# Patient Record
Sex: FEMALE | Race: WHITE | NOT HISPANIC OR LATINO | Employment: UNEMPLOYED | URBAN - METROPOLITAN AREA
[De-identification: names, ages, dates, MRNs, and addresses within clinical notes are randomized per-mention and may not be internally consistent; named-entity substitution may affect disease eponyms.]

---
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
## 2024-07-09 ENCOUNTER — OFFICE VISIT (OUTPATIENT)
Dept: ENDOCRINOLOGY | Facility: CLINIC | Age: 19
End: 2024-07-09
Payer: COMMERCIAL

## 2024-07-09 VITALS
BODY MASS INDEX: 23.32 KG/M2 | OXYGEN SATURATION: 98 % | SYSTOLIC BLOOD PRESSURE: 102 MMHG | DIASTOLIC BLOOD PRESSURE: 68 MMHG | HEIGHT: 65 IN | WEIGHT: 140 LBS | HEART RATE: 72 BPM

## 2024-07-09 DIAGNOSIS — R42 LIGHTHEADEDNESS: ICD-10-CM

## 2024-07-09 DIAGNOSIS — R53.83 OTHER FATIGUE: ICD-10-CM

## 2024-07-09 DIAGNOSIS — E16.2 HYPOGLYCEMIA: Primary | ICD-10-CM

## 2024-07-09 DIAGNOSIS — E61.1 IRON DEFICIENCY: ICD-10-CM

## 2024-07-09 PROCEDURE — 99204 OFFICE O/P NEW MOD 45 MIN: CPT | Performed by: INTERNAL MEDICINE

## 2024-07-09 RX ORDER — MULTIVITAMIN
1 TABLET ORAL DAILY
COMMUNITY

## 2024-07-09 NOTE — PATIENT INSTRUCTIONS
Please have the lab work done around 8 AM.  Please check your blood sugars at least once daily and when you have symptoms of low blood sugars.     Hypoglycemia instructions   Low Blood Sugar  Steps to treat low blood sugar.    1. Test blood sugar if you have symptoms of low blood sugar:   Low Blood Sugar Symptoms:  o Sweaty  o Dizzy  o Rapid heartbeat  o Shaky  o Bad mood  o Hungry    2. Treat blood sugar less than 70 with 15 grams of fast-acting carbohydrate:   Examples of 15 grams Fast-Acting Carbohydrate:  o 4 oz juice  o 4 oz regular soda  o 3-4 glucose tablets (chew)  o 3-4 hard candies (chew)            3.   Wait 15 minutes and test your blood sugar again         4. If blood sugar is less than 100, repeat steps 2-3.    5. When your blood sugar is 100 or more, eat a snack if it will be longer than one hour until your next meal. The snack should be 15 grams of carbohydrate and a protein:   Examples of snacks:  o ½ sandwich  o 6 crackers with cheese  o Piece of fruit with cheese or peanut butter  o 6 crackers with peanut butter
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
## 2024-07-10 ENCOUNTER — APPOINTMENT (OUTPATIENT)
Dept: LAB | Facility: CLINIC | Age: 19
End: 2024-07-10
Payer: COMMERCIAL

## 2024-07-10 DIAGNOSIS — R42 LIGHTHEADEDNESS: ICD-10-CM

## 2024-07-10 DIAGNOSIS — E16.2 HYPOGLYCEMIA: ICD-10-CM

## 2024-07-10 DIAGNOSIS — E61.1 IRON DEFICIENCY: ICD-10-CM

## 2024-07-10 LAB
CORTIS AM PEAK SERPL-MCNC: 9.9 UG/DL (ref 6.7–22.6)
ERYTHROCYTE [DISTWIDTH] IN BLOOD BY AUTOMATED COUNT: 12.6 % (ref 11.6–15.1)
EST. AVERAGE GLUCOSE BLD GHB EST-MCNC: 103 MG/DL
FERRITIN SERPL-MCNC: 11 NG/ML (ref 11–307)
HBA1C MFR BLD: 5.2 %
HCT VFR BLD AUTO: 38.4 % (ref 34.8–46.1)
HGB BLD-MCNC: 12.5 G/DL (ref 11.5–15.4)
MCH RBC QN AUTO: 29 PG (ref 26.8–34.3)
MCHC RBC AUTO-ENTMCNC: 32.6 G/DL (ref 31.4–37.4)
MCV RBC AUTO: 89 FL (ref 82–98)
PLATELET # BLD AUTO: 249 THOUSANDS/UL (ref 149–390)
PMV BLD AUTO: 10.5 FL (ref 8.9–12.7)
RBC # BLD AUTO: 4.31 MILLION/UL (ref 3.81–5.12)
T4 FREE SERPL-MCNC: 0.78 NG/DL (ref 0.61–1.12)
TSH SERPL DL<=0.05 MIU/L-ACNC: 2.6 UIU/ML (ref 0.45–4.5)
WBC # BLD AUTO: 7.62 THOUSAND/UL (ref 4.31–10.16)

## 2024-07-10 PROCEDURE — 36415 COLL VENOUS BLD VENIPUNCTURE: CPT

## 2024-07-10 PROCEDURE — 80053 COMPREHEN METABOLIC PANEL: CPT

## 2024-07-10 PROCEDURE — 84439 ASSAY OF FREE THYROXINE: CPT

## 2024-07-10 PROCEDURE — 84443 ASSAY THYROID STIM HORMONE: CPT

## 2024-07-10 PROCEDURE — 83036 HEMOGLOBIN GLYCOSYLATED A1C: CPT

## 2024-07-10 PROCEDURE — 83540 ASSAY OF IRON: CPT

## 2024-07-10 PROCEDURE — 83550 IRON BINDING TEST: CPT

## 2024-07-10 PROCEDURE — 82533 TOTAL CORTISOL: CPT

## 2024-07-10 PROCEDURE — 85027 COMPLETE CBC AUTOMATED: CPT

## 2024-07-10 PROCEDURE — 82728 ASSAY OF FERRITIN: CPT

## 2024-07-11 LAB
ALBUMIN SERPL BCG-MCNC: 4.2 G/DL (ref 3.5–5)
ALP SERPL-CCNC: 61 U/L (ref 34–104)
ALT SERPL W P-5'-P-CCNC: 11 U/L (ref 7–52)
ANION GAP SERPL CALCULATED.3IONS-SCNC: 6 MMOL/L (ref 4–13)
AST SERPL W P-5'-P-CCNC: 13 U/L (ref 13–39)
BILIRUB SERPL-MCNC: 0.42 MG/DL (ref 0.2–1)
BUN SERPL-MCNC: 6 MG/DL (ref 5–25)
CALCIUM SERPL-MCNC: 9.1 MG/DL (ref 8.4–10.2)
CHLORIDE SERPL-SCNC: 103 MMOL/L (ref 96–108)
CO2 SERPL-SCNC: 25 MMOL/L (ref 21–32)
CREAT SERPL-MCNC: 0.57 MG/DL (ref 0.6–1.3)
GFR SERPL CREATININE-BSD FRML MDRD: 134 ML/MIN/1.73SQ M
GLUCOSE P FAST SERPL-MCNC: 94 MG/DL (ref 65–99)
IRON SATN MFR SERPL: 17 % (ref 15–50)
IRON SERPL-MCNC: 59 UG/DL (ref 50–212)
POTASSIUM SERPL-SCNC: 4.5 MMOL/L (ref 3.5–5.3)
PROT SERPL-MCNC: 6.9 G/DL (ref 6.4–8.4)
SODIUM SERPL-SCNC: 134 MMOL/L (ref 135–147)
TIBC SERPL-MCNC: 344 UG/DL (ref 250–450)
UIBC SERPL-MCNC: 285 UG/DL (ref 155–355)

## 2024-09-20 ENCOUNTER — TELEPHONE (OUTPATIENT)
Dept: OTOLARYNGOLOGY | Facility: CLINIC | Age: 19
End: 2024-09-20

## 2024-10-14 ENCOUNTER — TELEPHONE (OUTPATIENT)
Dept: OTOLARYNGOLOGY | Facility: CLINIC | Age: 19
End: 2024-10-14

## 2024-10-25 ENCOUNTER — OFFICE VISIT (OUTPATIENT)
Dept: URGENT CARE | Facility: CLINIC | Age: 19
End: 2024-10-25
Payer: COMMERCIAL

## 2024-10-25 VITALS
OXYGEN SATURATION: 98 % | HEIGHT: 65 IN | RESPIRATION RATE: 18 BRPM | SYSTOLIC BLOOD PRESSURE: 114 MMHG | TEMPERATURE: 96.7 F | DIASTOLIC BLOOD PRESSURE: 60 MMHG | HEART RATE: 110 BPM | WEIGHT: 135.4 LBS | BODY MASS INDEX: 22.56 KG/M2

## 2024-10-25 DIAGNOSIS — R68.89 FLU-LIKE SYMPTOMS: Primary | ICD-10-CM

## 2024-10-25 PROCEDURE — 87636 SARSCOV2 & INF A&B AMP PRB: CPT | Performed by: FAMILY MEDICINE

## 2024-10-25 PROCEDURE — 99213 OFFICE O/P EST LOW 20 MIN: CPT | Performed by: FAMILY MEDICINE

## 2024-10-25 RX ORDER — ALBUTEROL SULFATE 1.25 MG/3ML
1.25 SOLUTION RESPIRATORY (INHALATION) EVERY 4 HOURS PRN
COMMUNITY

## 2024-10-25 RX ORDER — BENZONATATE 200 MG/1
200 CAPSULE ORAL 3 TIMES DAILY PRN
Qty: 20 CAPSULE | Refills: 0 | Status: SHIPPED | OUTPATIENT
Start: 2024-10-25

## 2024-10-25 RX ORDER — ALBUTEROL SULFATE 90 UG/1
2 INHALANT RESPIRATORY (INHALATION) EVERY 4 HOURS PRN
COMMUNITY

## 2024-10-25 RX ORDER — FLUTICASONE PROPIONATE 50 MCG
1 SPRAY, SUSPENSION (ML) NASAL 2 TIMES DAILY
Qty: 16 G | Refills: 0 | Status: SHIPPED | OUTPATIENT
Start: 2024-10-25 | End: 2024-10-28

## 2024-10-25 NOTE — PROGRESS NOTES
[facility]        NAME: Melia Jackson is a 19 y.o. female  : 2005    MRN: [id number]  DATE: 2024  TIME: 3:50 PM    Assessment and Plan   Flu-like symptoms [R68.89]  1. Flu-like symptoms  benzonatate (TESSALON) 200 MG capsule    fluticasone (FLONASE) 50 mcg/act nasal spray    Covid/Flu-Office Collect        COVID and flu PCR pending.  Tessalon Perles and Flonase prescribed for symptom relief.  Advised on supportive measures.    Patient Instructions     Follow up with PCP in 3-5 days.  Proceed to  ER if symptoms worsen.    If tests have been performed at [facility], our office will contact you with results if changes need to be made to the care plan discussed with you at the visit.  [facility]    Chief Complaint     Chief Complaint   Patient presents with    Cold Like Symptoms    Cough     X 10 days - congested cough with chest tightness into R back, occ. Wheeze, fatigue, sl. Sore throat and skylar. Ear pressure R > L> Taking Tylenol Cold and Flu.          History of Present Illness       20 yo old female presents today with about 9 days of symptoms concerning for walking pneumonia.  Reports that her brother was diagnosed with it recently.  Two days ago he was tested for COVID via rapid antigen testing and found to be negative.  She reports having nasal congestion, rhinorrhea, coughing, wheezing, fatigue, central sternum chest pain, sore throat, headaches, nausea among other things.  Denies any obvious fevers, dyspnea, abdominal pain or dizziness.    Cough  Associated symptoms include chest pain (sternum), ear pain, headaches, rhinorrhea, a sore throat and wheezing. Pertinent negatives include no chills or fever.       Review of Systems   Review of Systems   Constitutional:  Positive for fatigue. Negative for chills and fever.   HENT:  Positive for congestion, ear pain, rhinorrhea and sore throat.    Respiratory:  Positive for cough and wheezing.     Cardiovascular:  Positive for chest pain (sternum).   Gastrointestinal:  Positive for nausea. Negative for abdominal pain.   Neurological:  Positive for headaches. Negative for dizziness.     Current Medications       Current Outpatient Medications:     albuterol (ACCUNEB) 1.25 MG/3ML nebulizer solution, Take 1.25 mg by nebulization every 4 (four) hours as needed for wheezing, Disp: , Rfl:     albuterol (PROVENTIL HFA,VENTOLIN HFA) 90 mcg/act inhaler, Inhale 2 puffs every 4 (four) hours as needed for wheezing, Disp: , Rfl:     benzonatate (TESSALON) 200 MG capsule, Take 1 capsule (200 mg total) by mouth 3 (three) times a day as needed for cough, Disp: 20 capsule, Rfl: 0    fluticasone (FLONASE) 50 mcg/act nasal spray, 1 spray into each nostril 2 (two) times a day for 3 days, Disp: 16 g, Rfl: 0    Multiple Vitamin (multivitamin) tablet, Take 1 tablet by mouth daily, Disp: , Rfl:     Current Allergies     Allergies as of 10/25/2024 - Reviewed 10/25/2024   Allergen Reaction Noted    Gluten meal - food allergy GI Intolerance 05/20/2021    Ibuprofen Wheezing 12/10/2021    Lactose intolerance (gi) - food allergy GI Intolerance 08/31/2016    Other Hives 12/20/2021    Ciprofloxacin Nausea Only, Other (See Comments), and Vomiting 05/12/2023            The following portions of the patient's history were reviewed and updated as appropriate: allergies, current medications, past family history, past medical history, past social history, past surgical history and problem list.     Past Medical History:   Diagnosis Date    Allergic rhinitis     Anemia     Asthma     Lactose intolerance     Urinary tract infection        Past Surgical History:   Procedure Laterality Date    WISDOM TOOTH EXTRACTION Bilateral     4 yrs ago       Family History   Problem Relation Age of Onset    Arthritis Mother     Cancer Mother     Colon cancer Mother     Thyroid disease unspecified Mother     Colon polyps Father     Learning disabilities Sister   "   ADD / ADHD Brother     Arthritis Brother     No Known Problems Brother     No Known Problems Maternal Aunt     No Known Problems Paternal Uncle     No Known Problems Paternal Uncle     No Known Problems Paternal Uncle     Heart disease Maternal Grandfather     Diabetes Maternal Grandfather     Thyroid disease unspecified Paternal Grandmother     Diabetes unspecified Paternal Grandfather          Medications have been verified.        Objective   /60   Pulse (!) 110   Temp (!) 96.7 °F (35.9 °C)   Resp 18   Ht 5' 5\" (1.651 m)   Wt 61.4 kg (135 lb 6.4 oz)   LMP 10/14/2024 (Exact Date)   SpO2 98%   BMI 22.53 kg/m²   Patient's last menstrual period was 10/14/2024 (exact date).       Physical Exam     Physical Exam  Vitals and nursing note reviewed.   Constitutional:       General: She is in acute distress.      Appearance: Normal appearance. She is normal weight. She is not ill-appearing or toxic-appearing.   HENT:      Head: Normocephalic and atraumatic.      Nose: Congestion and rhinorrhea present.      Comments: Inflamed nasal mucosa     Mouth/Throat:      Mouth: Mucous membranes are moist.      Pharynx: No posterior oropharyngeal erythema.   Eyes:      General:         Right eye: No discharge.         Left eye: No discharge.      Conjunctiva/sclera: Conjunctivae normal.   Cardiovascular:      Rate and Rhythm: Normal rate and regular rhythm.   Pulmonary:      Effort: Pulmonary effort is normal. No respiratory distress.      Breath sounds: Normal breath sounds. No wheezing, rhonchi or rales.   Skin:     General: Skin is warm.      Findings: No erythema.   Neurological:      General: No focal deficit present.      Mental Status: She is alert and oriented to person, place, and time.   Psychiatric:         Mood and Affect: Mood normal.         Behavior: Behavior normal.         Thought Content: Thought content normal.         Judgment: Judgment normal.                   "

## 2024-10-28 ENCOUNTER — TELEPHONE (OUTPATIENT)
Dept: LAB | Facility: HOSPITAL | Age: 19
End: 2024-10-28

## 2024-10-29 LAB
FLUAV RNA RESP QL NAA+PROBE: NEGATIVE
FLUBV RNA RESP QL NAA+PROBE: NEGATIVE
SARS-COV-2 RNA RESP QL NAA+PROBE: NEGATIVE

## 2024-12-06 ENCOUNTER — TELEPHONE (OUTPATIENT)
Dept: OTOLARYNGOLOGY | Facility: CLINIC | Age: 19
End: 2024-12-06

## 2024-12-06 NOTE — TELEPHONE ENCOUNTER
"Tried to contact patient for the reason she is coming to see ENT on 12/9. The reason is \"scope\", but does not identify what is needing to be scoped and there are no provider notes that explain this.  Did not leave message due to the late hour of the day.   "

## 2024-12-09 ENCOUNTER — OFFICE VISIT (OUTPATIENT)
Dept: OTOLARYNGOLOGY | Facility: CLINIC | Age: 19
End: 2024-12-09
Payer: COMMERCIAL

## 2024-12-09 VITALS — TEMPERATURE: 98 F | WEIGHT: 135 LBS | HEIGHT: 65 IN | BODY MASS INDEX: 22.49 KG/M2

## 2024-12-09 DIAGNOSIS — K21.9 GASTROESOPHAGEAL REFLUX DISEASE, UNSPECIFIED WHETHER ESOPHAGITIS PRESENT: Primary | ICD-10-CM

## 2024-12-09 PROCEDURE — 31575 DIAGNOSTIC LARYNGOSCOPY: CPT | Performed by: STUDENT IN AN ORGANIZED HEALTH CARE EDUCATION/TRAINING PROGRAM

## 2024-12-09 PROCEDURE — 99203 OFFICE O/P NEW LOW 30 MIN: CPT | Performed by: STUDENT IN AN ORGANIZED HEALTH CARE EDUCATION/TRAINING PROGRAM

## 2024-12-09 RX ORDER — PANTOPRAZOLE SODIUM 40 MG/1
40 TABLET, DELAYED RELEASE ORAL DAILY
Qty: 30 TABLET | Refills: 6 | Status: SHIPPED | OUTPATIENT
Start: 2024-12-09

## 2024-12-09 NOTE — PATIENT INSTRUCTIONS
Reflux Laryngitis or Laryngopharyngeal Reflux (LPR)  Definition: Stomach contents return through the esophagus and into the throat    While gastroesophageal reflux (GERD) may cause heartburn, regurgitation, belching, and indigestion, frequently patients with LPR will never experience these symptoms.  For this reason, LPR is sometimes referred to as “silent reflux”.  Despite the absence of GERD symptoms, patients with LPR may have:    Hoarseness (raspy, froggy, gravelly)  Mucous/phlegm  Throat clearing  Cough   Sore/dry throat  Post nasal drip  Sour/bitter/metallic taste  Sensation of a lump in the throat (globus)  Feeling as though the throat is closing (laryngospasm)  Asthma-related symptoms  Effortful swallowing or food getting stuck in the throat      If untreated, GERD and LPR may cause esophageal and laryngeal cancer      Treating LPR is challenging, and often, it is inadequately treated with standard GERD regimens.  The reason for this can be explained simply by the fact that only 1 episode of reflux in the throat can cause symptoms. On the other hand, 50 or more episodes per day of reflux into the esophagus may result in little or no symptoms.    The approach to treatment of LPR is based on certain premises:  -Reflux is a chronic condition and its management should be lifelong.  -Being proactive is better than reactive… once the symptom has occurred, it is too late to treat.  Taking medication that reduces acid reflux 24/7 is ideal (such as with PPI).  -The only way to “cure” reflux is with surgery; the goal of non-surgical therapy (medication) is to achieve an acceptable degree of symptom control and to eliminate inflammation in the stomach, esophagus, and throat visible on endoscopy.        What can make LPR worse?  Certain foods and beverages  Eating large meals, eating too fast  Eating/drinking within 3-4 hours of going to bed  Weight gain/obesity  Gluten intolerance (ex. Celiac disease)  Esophageal  dysmotility, hiatal hernia   Delayed stomach emptying  Diabetes  Stress/Anxiety  Medications (beta blockers, calcium channel blockers, bisphosphonates, anticholinergics, morphine/opioids, benzodiazepines, glucocorticoid steroids)    Treatment of Reflux - Top 10 List    Avoid “trigger foods” (see below)  Eat smaller meals   Avoid eating within 3-4 hours of going to bed  Lose weight/exercise  Reduce stress  Chew gum - saliva helps to neutralize acid  Drink alkaline water (pH above 8.5) - to neutralize stomach acid, denature pepsin  Dropping Acid: The Reflux Diet Cookbook and Cure, by Dr. Shayne Rodriguez  Mediterranean diet, Gluten free diet  Reflux medication and/or reflux surgery (Nissen, Stretta, TIF, Linx, etc.)    Foods to AVOID:    Acidic foods = citrus, fruit juice, tomato sauce, vinegar, preservatives  Caffeine (coffee, black tea, soda, energy drinks)  Alcohol  Carbonated drinks  Fatty, greasy, fried foods  Chocolate  Spicy foods  Garlic/onions    Medications used to treat acid reflux:    Proton pump inhibitors (PPI)   Taken 30-60 minutes before eating  omeprazole (Prilosec), lansoprazole (Prevacid), pantoprazole (Protonix), rabeprazole (Aciphex), esomeprazole (Nexium), dexlansoprazole (Dexilant)  Histamine-2 Receptor blockers (H2B)   Typically taken at bedtime for breakthrough acid reflux when sleeping  ranitidine (Zantac), famotidine (Pepcid), cimetidine (Tagamet)  Sodium alginate (Gaviscon)  Prokinetic agents (metoclopramide, erythromycin, domperidone)    Ways to elevate your head at bedtime:    Blocks under the bed posts (6 inches at the head of bed)  Wedge (under mattress)  Sleep position device - a portion may be covered by your insurance  Avoid the temptation to purchase a “Craft-matic” type of bed.  *Bending at the waist places pressure on the stomach causing more reflux.            Medcline  MedCline meets the IRS Guidelines and may be classified as an FSA/HSA approved medical expenditure.

## 2024-12-09 NOTE — PROGRESS NOTES
[facility]  [facility]  [facility] Otolaryngology  Otolaryngology -- Head and Neck Surgery New Patient Visit  Melia Jackson is a 19 y.o. who presents with a chief complaint of acid reflux. It started about 1.5 years ago after she had Nexplanon placed. She was having nausea after eating. She had the implant removed, but the reflux persisted. She has tried Tums with minimal relief. Has not seen GI or had H. pylori testing. She notes heartburn and nausea as her most prominent symptoms.        Review of systems: Pertinent review of systems documented in the HPI. 10 point ROS documented in a separate note, as necessary.  Results reviewed; images from any scan have been personally reviewed:        The past medical, surgical, social and family history have been reviewed as documented in today's record.  Past Medical History:   Diagnosis Date    Allergic rhinitis     Anemia     Asthma     Lactose intolerance     Urinary tract infection      Past Surgical History:   Procedure Laterality Date    WISDOM TOOTH EXTRACTION Bilateral     4 yrs ago     Family History   Problem Relation Age of Onset    Arthritis Mother     Cancer Mother     Colon cancer Mother     Thyroid disease unspecified Mother     Colon polyps Father     Learning disabilities Sister     ADD / ADHD Brother     Arthritis Brother     No Known Problems Brother     No Known Problems Maternal Aunt     No Known Problems Paternal Uncle     No Known Problems Paternal Uncle     No Known Problems Paternal Uncle     Heart disease Maternal Grandfather     Diabetes Maternal Grandfather     Thyroid disease unspecified Paternal Grandmother     Diabetes unspecified Paternal Grandfather      Current Outpatient Medications on File Prior to Visit   Medication Sig Dispense Refill    albuterol (ACCUNEB) 1.25 MG/3ML nebulizer solution Take 1.25 mg by nebulization every 4 (four) hours as needed for wheezing      albuterol (PROVENTIL HFA,VENTOLIN HFA)  "90 mcg/act inhaler Inhale 2 puffs every 4 (four) hours as needed for wheezing      Multiple Vitamin (multivitamin) tablet Take 1 tablet by mouth daily      [DISCONTINUED] benzonatate (TESSALON) 200 MG capsule Take 1 capsule (200 mg total) by mouth 3 (three) times a day as needed for cough 20 capsule 0    [DISCONTINUED] fluticasone (FLONASE) 50 mcg/act nasal spray 1 spray into each nostril 2 (two) times a day for 3 days 16 g 0     No current facility-administered medications on file prior to visit.      Physical exam:   Temp 98 °F (36.7 °C) (Temporal)   Ht 5' 5\" (1.651 m)   Wt 61.2 kg (135 lb)   LMP  (LMP Unknown)   BMI 22.47 kg/m²   Head: Atraumatic, normocephalic  Ears:    Right ear :  Auricle normal in appearance, mastoid prominence non-tender, external auditory canal clear. Tympanic membranes intact.   Left ear :  Auricle normal in appearance, mastoid prominence non-tender, external auditory canal clear . Tympanic membranes intact.   Nose/Sinuses:  External appearance unremarkable, no maxillary or frontal sinus tenderness to palpation bilaterally. Anterior rhinoscopy reveals: no obvious discharge or crusting.  Oral Cavity:  Moist mucus membranes, gums and dentition unremarkable, no oral mucosal masses or lesions, floor of mouth soft, tongue mobile without masses or lesions.   Oropharynx:  Base of tongue soft and without masses, tonsils bilaterally unremarkable, soft palate mucosa unremarkable.   Eyes:  Extra-ocular movements intact, lids and conjunctivae are normal in appearance.  Constitutional:  Well developed, well nourished and groomed, in no acute distress.   Respiratory:  Normal respiratory effort without evidence of retractions or use of accessory muscles.  Neurologic:  Grossly intact  Psychiatric:  Alert and oriented to time, place and person.    Procedures  Flexible laryngoscopy performed:  The nasal cavities were decongested with lidocaine and oxymetazoline spray.   Fiberoptic scope advanced through " left nare, findings:  Nasal cavity: Septum deviated, no polyps or mucopus.  Nasopharynx: unremarkable, no masses or lesions, eustachian tube orifi and Fossae of Rosenmuller unremakable.  Oropharynx: mucosa moist, no masses or lesions, tongue base, lateral and posterior walls normal  Larynx: True vocal folds mobile, no masses or lesions, widely patent glottic chink, arytenoids erythema   Hypopharynx: Pyriform sinuses clear without masses or pooling.  Post-cricoid area unremarkable.   The patient tolerated the procedure well.    Assessment:   1. Gastroesophageal reflux disease, unspecified whether esophagitis present  pantoprazole (PROTONIX) 40 mg tablet        Orders  No orders of the defined types were placed in this encounter.    Discussion/Plan:    GERD and Reflux laryngitis   Treatment strategies discussed included decreasing caffeine intake, discontinuing late night eating, sleeping with the head of the bed elevated. Also decrease coughing and throat-clearing behaviors. A proton pump inhibitor was prescribed.   We will consider more aggressive acid suppression treatment or 24 hours PH monitoring if no improvement after medication use for more than 4 weeks.

## 2025-01-02 ENCOUNTER — OFFICE VISIT (OUTPATIENT)
Dept: URGENT CARE | Facility: CLINIC | Age: 20
End: 2025-01-02
Payer: COMMERCIAL

## 2025-01-02 VITALS
HEIGHT: 65 IN | TEMPERATURE: 97.8 F | WEIGHT: 135 LBS | OXYGEN SATURATION: 97 % | BODY MASS INDEX: 22.49 KG/M2 | SYSTOLIC BLOOD PRESSURE: 127 MMHG | DIASTOLIC BLOOD PRESSURE: 70 MMHG | RESPIRATION RATE: 18 BRPM

## 2025-01-02 DIAGNOSIS — N39.0 URINARY TRACT INFECTION WITHOUT HEMATURIA, SITE UNSPECIFIED: Primary | ICD-10-CM

## 2025-01-02 DIAGNOSIS — R30.0 BURNING WITH URINATION: ICD-10-CM

## 2025-01-02 LAB
SL AMB  POCT GLUCOSE, UA: ABNORMAL
SL AMB LEUKOCYTE ESTERASE,UA: ABNORMAL
SL AMB POCT BILIRUBIN,UA: ABNORMAL
SL AMB POCT BLOOD,UA: ABNORMAL
SL AMB POCT CLARITY,UA: CLEAR
SL AMB POCT COLOR,UA: YELLOW
SL AMB POCT KETONES,UA: ABNORMAL
SL AMB POCT NITRITE,UA: ABNORMAL
SL AMB POCT PH,UA: 5
SL AMB POCT SPECIFIC GRAVITY,UA: 1.01
SL AMB POCT URINE PROTEIN: ABNORMAL
SL AMB POCT UROBILINOGEN: 0.2

## 2025-01-02 PROCEDURE — 87086 URINE CULTURE/COLONY COUNT: CPT | Performed by: PHYSICIAN ASSISTANT

## 2025-01-02 PROCEDURE — 99213 OFFICE O/P EST LOW 20 MIN: CPT | Performed by: PHYSICIAN ASSISTANT

## 2025-01-02 PROCEDURE — 81002 URINALYSIS NONAUTO W/O SCOPE: CPT | Performed by: PHYSICIAN ASSISTANT

## 2025-01-02 RX ORDER — CEPHALEXIN 500 MG/1
500 CAPSULE ORAL EVERY 12 HOURS SCHEDULED
Qty: 14 CAPSULE | Refills: 0 | Status: SHIPPED | OUTPATIENT
Start: 2025-01-02 | End: 2025-01-09

## 2025-01-02 NOTE — PROGRESS NOTES
[facility]        NAME: Melia Jackson is a 19 y.o. female  : 2005    MRN: [id number]  DATE: 2025  TIME: 3:35 PM    Assessment and Plan   Urinary tract infection without hematuria, site unspecified [N39.0]  1. Urinary tract infection without hematuria, site unspecified  cephalexin (KEFLEX) 500 mg capsule      2. Burning with urination  POCT urine dip    Urine culture    Urine culture            Patient Instructions     Patient Instructions   Your urine dip came back positive. I will send for culture and follow up if the antibiotic needs to be changed.   I recommend Pyridium over the counter for discomfort. You can take it for 2 days.  I recommend you drink plenty of fluids. Monitor for any worsening symptoms. If you develop worse abdominal pain, back pain, fever/chills, inability to drink liquids or have a decrease in the amount of urine you make go to the Emergency room.           Follow up with PCP in 3-5 days.  Proceed to  ER if symptoms worsen.    Chief Complaint     Chief Complaint   Patient presents with    Possible UTI     On the  she started with frequency urgency and burning. She is taking azo.          History of Present Illness       The patient is a 19-year-old female presenting today for urinary urgency, frequency and dysuria.  Symptoms began on  with only frequency. On the  symptoms worsened. She denies abdominal pain, fevers, back pain or hematuria.  Has tried Azo for symptoms.      Review of Systems   Review of Systems   Constitutional:  Negative for activity change, appetite change, chills, fatigue and fever.   HENT:  Negative for congestion, ear pain, rhinorrhea, sinus pressure, sinus pain and sore throat.    Eyes:  Negative for pain and visual disturbance.   Respiratory:  Negative for cough, chest tightness and shortness of breath.    Cardiovascular:  Negative for chest pain and palpitations.   Gastrointestinal:  Negative for abdominal pain, diarrhea,  nausea and vomiting.   Genitourinary:  Positive for dysuria, frequency and urgency. Negative for hematuria.   Musculoskeletal:  Negative for arthralgias, back pain and myalgias.   Skin:  Negative for color change, pallor and rash.   Neurological:  Negative for seizures, syncope and headaches.   All other systems reviewed and are negative.        Current Medications       Current Outpatient Medications:     albuterol (ACCUNEB) 1.25 MG/3ML nebulizer solution, Take 1.25 mg by nebulization every 4 (four) hours as needed for wheezing, Disp: , Rfl:     albuterol (PROVENTIL HFA,VENTOLIN HFA) 90 mcg/act inhaler, Inhale 2 puffs every 4 (four) hours as needed for wheezing, Disp: , Rfl:     cephalexin (KEFLEX) 500 mg capsule, Take 1 capsule (500 mg total) by mouth every 12 (twelve) hours for 7 days, Disp: 14 capsule, Rfl: 0    Multiple Vitamin (multivitamin) tablet, Take 1 tablet by mouth daily, Disp: , Rfl:     pantoprazole (PROTONIX) 40 mg tablet, Take 1 tablet (40 mg total) by mouth daily 30 minutes before breakfast, Disp: 30 tablet, Rfl: 6    Current Allergies     Allergies as of 01/02/2025 - Reviewed 01/02/2025   Allergen Reaction Noted    Gluten meal - food allergy GI Intolerance 05/20/2021    Ibuprofen Wheezing 12/10/2021    Lactose intolerance (gi) - food allergy GI Intolerance 08/31/2016    Other Hives 12/20/2021    Ciprofloxacin Nausea Only, Other (See Comments), and Vomiting 05/12/2023            The following portions of the patient's history were reviewed and updated as appropriate: allergies, current medications, past family history, past medical history, past social history, past surgical history and problem list.     Past Medical History:   Diagnosis Date    Allergic rhinitis     Anemia     Asthma     Lactose intolerance     Urinary tract infection        Past Surgical History:   Procedure Laterality Date    WISDOM TOOTH EXTRACTION Bilateral     4 yrs ago       Family History   Problem Relation Age of Onset     "Arthritis Mother     Cancer Mother     Colon cancer Mother     Thyroid disease unspecified Mother     Colon polyps Father     Learning disabilities Sister     ADD / ADHD Brother     Arthritis Brother     No Known Problems Brother     No Known Problems Maternal Aunt     No Known Problems Paternal Uncle     No Known Problems Paternal Uncle     No Known Problems Paternal Uncle     Heart disease Maternal Grandfather     Diabetes Maternal Grandfather     Thyroid disease unspecified Paternal Grandmother     Diabetes unspecified Paternal Grandfather          Medications have been verified.        Objective   /70   Temp 97.8 °F (36.6 °C)   Resp 18   Ht 5' 5\" (1.651 m)   Wt 61.2 kg (135 lb)   LMP  (LMP Unknown)   SpO2 97%   BMI 22.47 kg/m²        Physical Exam     Physical Exam  Vitals and nursing note reviewed.   Constitutional:       General: She is not in acute distress.     Appearance: Normal appearance. She is normal weight. She is not ill-appearing, toxic-appearing or diaphoretic.   HENT:      Head: Normocephalic and atraumatic.      Mouth/Throat:      Mouth: Mucous membranes are moist.      Pharynx: Oropharynx is clear. No oropharyngeal exudate or posterior oropharyngeal erythema.   Cardiovascular:      Rate and Rhythm: Normal rate and regular rhythm.      Heart sounds: Normal heart sounds. No murmur heard.     No friction rub. No gallop.   Pulmonary:      Effort: Pulmonary effort is normal. No respiratory distress.      Breath sounds: Normal breath sounds. No stridor. No wheezing, rhonchi or rales.   Chest:      Chest wall: No tenderness.   Abdominal:      General: Abdomen is flat. Bowel sounds are normal. There is no distension.      Palpations: Abdomen is soft. There is no mass.      Tenderness: There is no abdominal tenderness. There is no right CVA tenderness, left CVA tenderness, guarding or rebound.      Hernia: No hernia is present.   Skin:     General: Skin is warm and dry.      Capillary Refill: " Capillary refill takes less than 2 seconds.   Neurological:      Mental Status: She is alert.

## 2025-01-03 LAB — BACTERIA UR CULT: NORMAL

## 2025-04-30 ENCOUNTER — TELEPHONE (OUTPATIENT)
Dept: HEMATOLOGY ONCOLOGY | Facility: MEDICAL CENTER | Age: 20
End: 2025-04-30

## 2025-04-30 DIAGNOSIS — D64.9 ANEMIA, UNSPECIFIED TYPE: Primary | ICD-10-CM

## 2025-04-30 NOTE — TELEPHONE ENCOUNTER
Called patient, left voice mail message informing her she has labs that need to be collected prior to her appointment next week with Sona.

## 2025-05-01 ENCOUNTER — APPOINTMENT (OUTPATIENT)
Dept: LAB | Facility: CLINIC | Age: 20
End: 2025-05-01
Attending: PHYSICIAN ASSISTANT
Payer: COMMERCIAL

## 2025-05-01 DIAGNOSIS — D64.9 ANEMIA, UNSPECIFIED TYPE: ICD-10-CM

## 2025-05-01 LAB
BASOPHILS # BLD AUTO: 0.03 THOUSANDS/ÂΜL (ref 0–0.1)
BASOPHILS NFR BLD AUTO: 0 % (ref 0–1)
EOSINOPHIL # BLD AUTO: 0.08 THOUSAND/ÂΜL (ref 0–0.61)
EOSINOPHIL NFR BLD AUTO: 1 % (ref 0–6)
ERYTHROCYTE [DISTWIDTH] IN BLOOD BY AUTOMATED COUNT: 13.2 % (ref 11.6–15.1)
FERRITIN SERPL-MCNC: 9 NG/ML (ref 30–307)
HCT VFR BLD AUTO: 37.7 % (ref 34.8–46.1)
HGB BLD-MCNC: 11.7 G/DL (ref 11.5–15.4)
IMM GRANULOCYTES # BLD AUTO: 0.01 THOUSAND/UL (ref 0–0.2)
IMM GRANULOCYTES NFR BLD AUTO: 0 % (ref 0–2)
IRON SATN MFR SERPL: 10 % (ref 15–50)
IRON SERPL-MCNC: 41 UG/DL (ref 50–212)
LYMPHOCYTES # BLD AUTO: 1.93 THOUSANDS/ÂΜL (ref 0.6–4.47)
LYMPHOCYTES NFR BLD AUTO: 26 % (ref 14–44)
MCH RBC QN AUTO: 28.7 PG (ref 26.8–34.3)
MCHC RBC AUTO-ENTMCNC: 31 G/DL (ref 31.4–37.4)
MCV RBC AUTO: 92 FL (ref 82–98)
MONOCYTES # BLD AUTO: 0.63 THOUSAND/ÂΜL (ref 0.17–1.22)
MONOCYTES NFR BLD AUTO: 9 % (ref 4–12)
NEUTROPHILS # BLD AUTO: 4.76 THOUSANDS/ÂΜL (ref 1.85–7.62)
NEUTS SEG NFR BLD AUTO: 64 % (ref 43–75)
NRBC BLD AUTO-RTO: 0 /100 WBCS
PLATELET # BLD AUTO: 261 THOUSANDS/UL (ref 149–390)
PMV BLD AUTO: 11.3 FL (ref 8.9–12.7)
RBC # BLD AUTO: 4.08 MILLION/UL (ref 3.81–5.12)
TIBC SERPL-MCNC: 397.6 UG/DL (ref 250–450)
TRANSFERRIN SERPL-MCNC: 284 MG/DL (ref 203–362)
UIBC SERPL-MCNC: 357 UG/DL (ref 155–355)
WBC # BLD AUTO: 7.44 THOUSAND/UL (ref 4.31–10.16)

## 2025-05-01 PROCEDURE — 85025 COMPLETE CBC W/AUTO DIFF WBC: CPT

## 2025-05-01 PROCEDURE — 83550 IRON BINDING TEST: CPT

## 2025-05-01 PROCEDURE — 36415 COLL VENOUS BLD VENIPUNCTURE: CPT

## 2025-05-01 PROCEDURE — 82728 ASSAY OF FERRITIN: CPT

## 2025-05-01 PROCEDURE — 83540 ASSAY OF IRON: CPT

## 2025-05-05 ENCOUNTER — TELEPHONE (OUTPATIENT)
Dept: HEMATOLOGY ONCOLOGY | Facility: MEDICAL CENTER | Age: 20
End: 2025-05-05

## 2025-05-05 ENCOUNTER — OFFICE VISIT (OUTPATIENT)
Dept: HEMATOLOGY ONCOLOGY | Facility: MEDICAL CENTER | Age: 20
End: 2025-05-05
Payer: COMMERCIAL

## 2025-05-05 VITALS
HEIGHT: 65 IN | TEMPERATURE: 97.9 F | DIASTOLIC BLOOD PRESSURE: 62 MMHG | BODY MASS INDEX: 21.99 KG/M2 | SYSTOLIC BLOOD PRESSURE: 120 MMHG | WEIGHT: 132 LBS | HEART RATE: 93 BPM | RESPIRATION RATE: 16 BRPM | OXYGEN SATURATION: 99 %

## 2025-05-05 DIAGNOSIS — D64.9 ANEMIA, UNSPECIFIED TYPE: ICD-10-CM

## 2025-05-05 DIAGNOSIS — E61.1 IRON DEFICIENCY: ICD-10-CM

## 2025-05-05 DIAGNOSIS — D50.8 OTHER IRON DEFICIENCY ANEMIA: Primary | ICD-10-CM

## 2025-05-05 PROCEDURE — 99213 OFFICE O/P EST LOW 20 MIN: CPT | Performed by: PHYSICIAN ASSISTANT

## 2025-05-05 RX ORDER — DIPHENHYDRAMINE HCL 25 MG
25 TABLET ORAL ONCE
Start: 2025-05-19 | End: 2025-05-19

## 2025-05-05 RX ORDER — SODIUM CHLORIDE 9 MG/ML
20 INJECTION, SOLUTION INTRAVENOUS ONCE
OUTPATIENT
Start: 2025-05-19

## 2025-05-05 NOTE — ASSESSMENT & PLAN NOTE
Patient is a 19-year-old who presents for evaluation of iron deficiency anemia.    She has history of iron deficiency.  She previously followed with pediatric hematology at [facility].  She was treated with Injectafer around 4 years ago.    She had lab work drawn on 5/1/2025.  At that time total iron was 41, ferritin 9 saturation 10%.  WBC 7.44, hemoglobin 11.7, MCV 92, RDW 13.2, platelets 261.    She is unable to tolerate oral iron due to GI side effects.    She will be treated with Feraheme 510 mg weekly x 2.  She prefers to be treated with Benadryl with Feraheme infusions.  She has never had a reaction to IV iron but reports that her mother had anaphylactic reaction.    We discussed that iron deficiency is either related to bleeding or decreased absorption.  Reports gluten intolerance but has followed a strict gluten-free diet for several years.  She admits to heavy menstrual periods for which she follows with gynecology.  She has no evidence of bleeding otherwise.    She will return to clinic with repeat lab work in around 4 months time.  If her iron studies are normal at that time she can be made PRN and iron studies can be followed by her PCP.  Orders:    CBC and differential; Future    Comprehensive metabolic panel; Future    Iron Panel (Includes Ferritin, Iron Sat%, Iron, and TIBC); Future

## 2025-05-05 NOTE — PROGRESS NOTES
Name: Melia Jackson      : 2005      MRN: [id number]  Encounter Provider: Sona Ray PA-C  Encounter Date: 2025   Encounter department: [facility]  :  Assessment & Plan  Other iron deficiency anemia  Patient is a 19-year-old who presents for evaluation of iron deficiency anemia.    She has history of iron deficiency.  She previously followed with pediatric hematology at [facility].  She was treated with Injectafer around 4 years ago.    She had lab work drawn on 2025.  At that time total iron was 41, ferritin 9 saturation 10%.  WBC 7.44, hemoglobin 11.7, MCV 92, RDW 13.2, platelets 261.    She is unable to tolerate oral iron due to GI side effects.    She will be treated with Feraheme 510 mg weekly x 2.  She prefers to be treated with Benadryl with Feraheme infusions.  She has never had a reaction to IV iron but reports that her mother had anaphylactic reaction.    We discussed that iron deficiency is either related to bleeding or decreased absorption.  Reports gluten intolerance but has followed a strict gluten-free diet for several years.  She admits to heavy menstrual periods for which she follows with gynecology.  She has no evidence of bleeding otherwise.    She will return to clinic with repeat lab work in around 4 months time.  If her iron studies are normal at that time she can be made PRN and iron studies can be followed by her PCP.  Orders:    CBC and differential; Future    Comprehensive metabolic panel; Future    Iron Panel (Includes Ferritin, Iron Sat%, Iron, and TIBC); Future        History of Present Illness   Chief Complaint   Patient presents with    Consult   Patient is a 19-year-old who presents with her mother for evaluation of iron deficiency.    She has history of iron deficiency.  She previously followed with pediatric hematology at [facility].  She was treated with Injectafer around 4 years ago.    She had lab work drawn on 2025.  At that  time total iron was 41, ferritin 9 saturation 10%.  WBC 7.44, hemoglobin 11.7, MCV 92, RDW 13.2, platelets 261.    She has tried many different formulations but is unable to tolerate due to GI effects.    Her previous iron deficiency was felt to be related to limited oral intake.    She says that her menstrual periods are quite heavy.  Menstrual periods last 5 days. She says she uses an overnight pad and changes it every few hours.  She usually has 3 or 4 heavy days.  She does follow with gynecology.    She has a regular diet, not vegan or vegetarian.  She has never completed GI studies.  She does not follow with GI but does follow a gluten-free diet.  She admits to fatigue.  She denies nausea, vomiting, bowel changes, chest pain, shortness of breath.  She denies any evidence of GI bleeding, hematuria, epistaxis.    She has never had a reaction to IV iron.  She says that she previously received Benadryl with IV iron infusions due to history of anaphylactic reaction in patient's mother with iron infusions.    She says in the past she felt much better after receiving IV iron infusions.     Review of Systems   Constitutional:  Positive for fatigue. Negative for appetite change, fever and unexpected weight change.   HENT:  Negative for nosebleeds.    Respiratory:  Negative for cough, choking and shortness of breath.         Negative hemoptysis.   Cardiovascular:  Negative for chest pain, palpitations and leg swelling.   Gastrointestinal: Negative.  Negative for abdominal distention, abdominal pain, anal bleeding, blood in stool, constipation, diarrhea, nausea and vomiting.   Endocrine: Negative.  Negative for cold intolerance.   Genitourinary: Negative.  Negative for hematuria, menstrual problem, vaginal bleeding, vaginal discharge and vaginal pain.   Musculoskeletal: Negative.  Negative for arthralgias, myalgias, neck pain and neck stiffness.   Skin: Negative.  Negative for color change, pallor and rash.  "  Allergic/Immunologic: Negative.  Negative for immunocompromised state.   Neurological: Negative.  Negative for weakness and headaches.   Hematological:  Negative for adenopathy. Does not bruise/bleed easily.   All other systems reviewed and are negative.     Objective   /62 (BP Location: Right arm, Patient Position: Sitting, Cuff Size: Adult)   Pulse 93   Temp 97.9 °F (36.6 °C) (Temporal)   Resp 16   Ht 5' 5\" (1.651 m)   Wt 59.9 kg (132 lb)   SpO2 99%   BMI 21.97 kg/m²     Physical Exam  Constitutional:       General: She is not in acute distress.     Appearance: Normal appearance. She is well-developed.   HENT:      Head: Normocephalic and atraumatic.   Eyes:      General: No scleral icterus.     Conjunctiva/sclera: Conjunctivae normal.   Cardiovascular:      Rate and Rhythm: Normal rate and regular rhythm.   Pulmonary:      Effort: Pulmonary effort is normal. No respiratory distress.      Breath sounds: Normal breath sounds.   Abdominal:      General: Abdomen is flat. There is no distension.      Palpations: Abdomen is soft.      Tenderness: There is no abdominal tenderness.   Musculoskeletal:      Right lower leg: No edema.      Left lower leg: No edema.   Skin:     General: Skin is warm.      Coloration: Skin is not pale.      Findings: No rash.   Neurological:      Mental Status: She is alert and oriented to person, place, and time.   Psychiatric:         Mood and Affect: Mood normal.         Thought Content: Thought content normal.         Judgment: Judgment normal.           "

## 2025-06-02 ENCOUNTER — TELEPHONE (OUTPATIENT)
Dept: INFUSION CENTER | Facility: HOSPITAL | Age: 20
End: 2025-06-02

## 2025-06-04 ENCOUNTER — TELEPHONE (OUTPATIENT)
Age: 20
End: 2025-06-04

## 2025-06-04 ENCOUNTER — HOSPITAL ENCOUNTER (OUTPATIENT)
Dept: INFUSION CENTER | Facility: HOSPITAL | Age: 20
Discharge: HOME | End: 2025-06-04
Attending: INTERNAL MEDICINE

## 2025-06-04 NOTE — TELEPHONE ENCOUNTER
Spoke with [facility] team  Denial is being appealed   Will have infusion staff postpone treatment for one week  Voicemail left for patient to inform  Message send to [facility] infusion staff to postpone appt

## 2025-06-04 NOTE — TELEPHONE ENCOUNTER
Call received by Bobbi- listed on communication consent and identifiers were provided.     Bobbi received a letter from insurance stating Feraheme has been denied and will need to resubmit for Venofer.     Patient is scheduled to have feraheme completed today at 12:30PM. Bobbi will like to cancel until approve by insurance. :    Please call Bobbi with update.       Thanks.

## 2025-06-09 ENCOUNTER — TELEPHONE (OUTPATIENT)
Dept: INFUSION CENTER | Facility: HOSPITAL | Age: 20
End: 2025-06-09

## 2025-06-09 ENCOUNTER — TELEPHONE (OUTPATIENT)
Dept: HEMATOLOGY ONCOLOGY | Facility: MEDICAL CENTER | Age: 20
End: 2025-06-09

## 2025-06-09 DIAGNOSIS — E61.1 IRON DEFICIENCY: Primary | ICD-10-CM

## 2025-06-09 RX ORDER — DIPHENHYDRAMINE HCL 25 MG
25 TABLET ORAL ONCE
Status: CANCELLED
Start: 2025-06-18 | End: 2025-06-09

## 2025-06-09 RX ORDER — SODIUM CHLORIDE 9 MG/ML
20 INJECTION, SOLUTION INTRAVENOUS ONCE
Status: CANCELLED | OUTPATIENT
Start: 2025-06-09

## 2025-06-09 RX ORDER — SODIUM CHLORIDE 9 MG/ML
20 INJECTION, SOLUTION INTRAVENOUS ONCE
Status: CANCELLED | OUTPATIENT
Start: 2025-06-18

## 2025-06-09 NOTE — TELEPHONE ENCOUNTER
Feraheme denied  D/W PA   Ok to switch to sure venofer 200mg weekly x 5   Voicemail left for patient   Sending to  to arrange

## 2025-06-11 ENCOUNTER — HOSPITAL ENCOUNTER (OUTPATIENT)
Dept: INFUSION CENTER | Facility: HOSPITAL | Age: 20
Discharge: HOME | End: 2025-06-11
Attending: INTERNAL MEDICINE

## 2025-06-11 ENCOUNTER — TELEPHONE (OUTPATIENT)
Dept: INFUSION CENTER | Facility: HOSPITAL | Age: 20
End: 2025-06-11

## 2025-06-18 ENCOUNTER — HOSPITAL ENCOUNTER (OUTPATIENT)
Dept: INFUSION CENTER | Facility: HOSPITAL | Age: 20
Discharge: HOME | End: 2025-06-18
Attending: INTERNAL MEDICINE
Payer: COMMERCIAL

## 2025-06-18 VITALS
RESPIRATION RATE: 18 BRPM | TEMPERATURE: 98.2 F | DIASTOLIC BLOOD PRESSURE: 55 MMHG | SYSTOLIC BLOOD PRESSURE: 111 MMHG | HEART RATE: 83 BPM | OXYGEN SATURATION: 96 %

## 2025-06-18 DIAGNOSIS — E61.1 IRON DEFICIENCY: Primary | ICD-10-CM

## 2025-06-18 PROCEDURE — 96365 THER/PROPH/DIAG IV INF INIT: CPT

## 2025-06-18 RX ORDER — SODIUM CHLORIDE 9 MG/ML
20 INJECTION, SOLUTION INTRAVENOUS ONCE
Status: COMPLETED | OUTPATIENT
Start: 2025-06-18 | End: 2025-06-18

## 2025-06-18 RX ORDER — SODIUM CHLORIDE 9 MG/ML
20 INJECTION, SOLUTION INTRAVENOUS ONCE
Status: CANCELLED | OUTPATIENT
Start: 2025-06-25

## 2025-06-18 RX ORDER — DIPHENHYDRAMINE HCL 25 MG
25 TABLET ORAL ONCE
Status: CANCELLED
Start: 2025-06-25 | End: 2025-06-25

## 2025-06-18 RX ORDER — DIPHENHYDRAMINE HCL 25 MG
25 TABLET ORAL ONCE
Status: COMPLETED | OUTPATIENT
Start: 2025-06-18 | End: 2025-06-18

## 2025-06-18 RX ADMIN — IRON SUCROSE 200 MG: 20 INJECTION, SOLUTION INTRAVENOUS at 11:34

## 2025-06-18 RX ADMIN — SODIUM CHLORIDE 20 ML/HR: 9 INJECTION, SOLUTION INTRAVENOUS at 11:30

## 2025-06-18 RX ADMIN — DIPHENHYDRAMINE HYDROCHLORIDE 25 MG: 25 TABLET ORAL at 11:29

## 2025-06-25 ENCOUNTER — HOSPITAL ENCOUNTER (OUTPATIENT)
Dept: INFUSION CENTER | Facility: HOSPITAL | Age: 20
Discharge: HOME | End: 2025-06-25
Attending: INTERNAL MEDICINE
Payer: COMMERCIAL

## 2025-06-25 VITALS
TEMPERATURE: 98.6 F | HEART RATE: 87 BPM | RESPIRATION RATE: 18 BRPM | OXYGEN SATURATION: 96 % | SYSTOLIC BLOOD PRESSURE: 120 MMHG | DIASTOLIC BLOOD PRESSURE: 57 MMHG

## 2025-06-25 DIAGNOSIS — E61.1 IRON DEFICIENCY: Primary | ICD-10-CM

## 2025-06-25 PROCEDURE — 96365 THER/PROPH/DIAG IV INF INIT: CPT

## 2025-06-25 RX ORDER — DIPHENHYDRAMINE HCL 25 MG
25 TABLET ORAL ONCE
Status: CANCELLED
Start: 2025-07-02 | End: 2025-07-02

## 2025-06-25 RX ORDER — SODIUM CHLORIDE 9 MG/ML
20 INJECTION, SOLUTION INTRAVENOUS ONCE
Status: CANCELLED | OUTPATIENT
Start: 2025-07-02

## 2025-06-25 RX ORDER — SODIUM CHLORIDE 9 MG/ML
20 INJECTION, SOLUTION INTRAVENOUS ONCE
Status: COMPLETED | OUTPATIENT
Start: 2025-06-25 | End: 2025-06-25

## 2025-06-25 RX ORDER — DIPHENHYDRAMINE HCL 25 MG
25 TABLET ORAL ONCE
Status: COMPLETED | OUTPATIENT
Start: 2025-06-25 | End: 2025-06-25

## 2025-06-25 RX ADMIN — SODIUM CHLORIDE 20 ML/HR: 0.9 INJECTION, SOLUTION INTRAVENOUS at 14:36

## 2025-06-25 RX ADMIN — DIPHENHYDRAMINE HYDROCHLORIDE 25 MG: 25 TABLET ORAL at 14:35

## 2025-06-25 RX ADMIN — IRON SUCROSE 200 MG: 20 INJECTION, SOLUTION INTRAVENOUS at 14:36

## 2025-06-25 NOTE — PROGRESS NOTES
Melia Jackson  tolerated treatment well with no complications.      Melia Jackson is aware of future appt on 7/2 @ 8055.    AVS printed and given to Melia Jackson:   No (Declined by Melia Jackson)

## 2025-07-02 ENCOUNTER — HOSPITAL ENCOUNTER (OUTPATIENT)
Dept: INFUSION CENTER | Facility: HOSPITAL | Age: 20
Discharge: HOME | End: 2025-07-02
Attending: INTERNAL MEDICINE
Payer: COMMERCIAL

## 2025-07-02 VITALS
DIASTOLIC BLOOD PRESSURE: 60 MMHG | OXYGEN SATURATION: 97 % | HEART RATE: 72 BPM | RESPIRATION RATE: 18 BRPM | TEMPERATURE: 98.3 F | SYSTOLIC BLOOD PRESSURE: 113 MMHG

## 2025-07-02 DIAGNOSIS — E61.1 IRON DEFICIENCY: Primary | ICD-10-CM

## 2025-07-02 RX ORDER — SODIUM CHLORIDE 9 MG/ML
20 INJECTION, SOLUTION INTRAVENOUS ONCE
Status: CANCELLED | OUTPATIENT
Start: 2025-07-10

## 2025-07-02 RX ORDER — DIPHENHYDRAMINE HCL 25 MG
25 TABLET ORAL ONCE
Status: CANCELLED
Start: 2025-07-10 | End: 2025-07-09

## 2025-07-02 RX ORDER — DIPHENHYDRAMINE HCL 25 MG
25 TABLET ORAL ONCE
Status: COMPLETED | OUTPATIENT
Start: 2025-07-02 | End: 2025-07-02

## 2025-07-02 RX ORDER — SODIUM CHLORIDE 9 MG/ML
20 INJECTION, SOLUTION INTRAVENOUS ONCE
Status: COMPLETED | OUTPATIENT
Start: 2025-07-02 | End: 2025-07-02

## 2025-07-02 RX ADMIN — IRON SUCROSE 200 MG: 20 INJECTION, SOLUTION INTRAVENOUS at 12:44

## 2025-07-02 RX ADMIN — SODIUM CHLORIDE 20 ML/HR: 0.9 INJECTION, SOLUTION INTRAVENOUS at 12:43

## 2025-07-02 RX ADMIN — DIPHENHYDRAMINE HYDROCHLORIDE 25 MG: 25 TABLET ORAL at 12:43

## 2025-07-02 NOTE — PROGRESS NOTES
Melia Jackson  tolerated treatment well with no complications.      Melia Jackson is aware of future appt on 07/02 at 1430.     AVS printed and given to Melia Jackson:  No (Declined by Melia Jackson)

## 2025-07-10 ENCOUNTER — HOSPITAL ENCOUNTER (OUTPATIENT)
Dept: INFUSION CENTER | Facility: HOSPITAL | Age: 20
Discharge: HOME | End: 2025-07-10
Attending: INTERNAL MEDICINE
Payer: COMMERCIAL

## 2025-07-10 VITALS
OXYGEN SATURATION: 97 % | RESPIRATION RATE: 18 BRPM | HEART RATE: 87 BPM | SYSTOLIC BLOOD PRESSURE: 122 MMHG | DIASTOLIC BLOOD PRESSURE: 63 MMHG | TEMPERATURE: 98.2 F

## 2025-07-10 DIAGNOSIS — E61.1 IRON DEFICIENCY: Primary | ICD-10-CM

## 2025-07-10 PROCEDURE — 96365 THER/PROPH/DIAG IV INF INIT: CPT

## 2025-07-10 RX ORDER — DIPHENHYDRAMINE HCL 25 MG
25 TABLET ORAL ONCE
Status: COMPLETED | OUTPATIENT
Start: 2025-07-10 | End: 2025-07-10

## 2025-07-10 RX ORDER — SODIUM CHLORIDE 9 MG/ML
20 INJECTION, SOLUTION INTRAVENOUS ONCE
Status: CANCELLED | OUTPATIENT
Start: 2025-07-16

## 2025-07-10 RX ORDER — SODIUM CHLORIDE 9 MG/ML
20 INJECTION, SOLUTION INTRAVENOUS ONCE
Status: COMPLETED | OUTPATIENT
Start: 2025-07-10 | End: 2025-07-10

## 2025-07-10 RX ORDER — DIPHENHYDRAMINE HCL 25 MG
25 TABLET ORAL ONCE
Status: CANCELLED
Start: 2025-07-16 | End: 2025-07-16

## 2025-07-10 RX ADMIN — SODIUM CHLORIDE 20 ML/HR: 0.9 INJECTION, SOLUTION INTRAVENOUS at 14:54

## 2025-07-10 RX ADMIN — IRON SUCROSE 200 MG: 20 INJECTION, SOLUTION INTRAVENOUS at 14:54

## 2025-07-10 RX ADMIN — DIPHENHYDRAMINE HYDROCHLORIDE 25 MG: 25 TABLET ORAL at 14:52

## 2025-07-10 NOTE — PROGRESS NOTES
Melia Jackson  tolerated treatment well with no complications.      Melia Jackson is aware of future appt on 7/16 at 130p.     AVS printed and given to Melia Jackson:    No (Declined by Melia Jackson)

## 2025-07-16 ENCOUNTER — HOSPITAL ENCOUNTER (OUTPATIENT)
Dept: INFUSION CENTER | Facility: HOSPITAL | Age: 20
Discharge: HOME | End: 2025-07-16
Attending: INTERNAL MEDICINE
Payer: COMMERCIAL

## 2025-07-16 VITALS
DIASTOLIC BLOOD PRESSURE: 73 MMHG | TEMPERATURE: 98.2 F | HEART RATE: 85 BPM | RESPIRATION RATE: 18 BRPM | OXYGEN SATURATION: 96 % | SYSTOLIC BLOOD PRESSURE: 112 MMHG

## 2025-07-16 DIAGNOSIS — E61.1 IRON DEFICIENCY: Primary | ICD-10-CM

## 2025-07-16 PROCEDURE — 96365 THER/PROPH/DIAG IV INF INIT: CPT

## 2025-07-16 RX ORDER — SODIUM CHLORIDE 9 MG/ML
20 INJECTION, SOLUTION INTRAVENOUS ONCE
Status: CANCELLED | OUTPATIENT
Start: 2025-07-17

## 2025-07-16 RX ORDER — DIPHENHYDRAMINE HCL 25 MG
25 TABLET ORAL ONCE
Status: CANCELLED
Start: 2025-07-17 | End: 2025-07-17

## 2025-07-16 RX ORDER — SODIUM CHLORIDE 9 MG/ML
20 INJECTION, SOLUTION INTRAVENOUS ONCE
Status: COMPLETED | OUTPATIENT
Start: 2025-07-16 | End: 2025-07-16

## 2025-07-16 RX ORDER — DIPHENHYDRAMINE HCL 25 MG
25 TABLET ORAL ONCE
Status: COMPLETED | OUTPATIENT
Start: 2025-07-16 | End: 2025-07-16

## 2025-07-16 RX ADMIN — SODIUM CHLORIDE 20 ML/HR: 0.9 INJECTION, SOLUTION INTRAVENOUS at 13:31

## 2025-07-16 RX ADMIN — DIPHENHYDRAMINE HYDROCHLORIDE 25 MG: 25 TABLET ORAL at 13:31

## 2025-07-16 RX ADMIN — IRON SUCROSE 200 MG: 20 INJECTION, SOLUTION INTRAVENOUS at 13:32

## 2025-07-16 NOTE — PLAN OF CARE
Problem: Potential for Falls  Goal: Patient will remain free of falls  Description: INTERVENTIONS:  - Educate patient/family on patient safety including physical limitations  - Instruct patient to call for assistance with activity   - Consider consulting OT/PT to assist with strengthening/mobility based on AM PAC & JH-HLM score  - Consult OT/PT to assist with strengthening/mobility   - Keep Call bell within reach    Outcome: Progressing     Problem: Knowledge Deficit  Goal: Patient/family/caregiver demonstrates understanding of disease process, treatment plan, medications, and discharge instructions  Description: Complete learning assessment and assess knowledge base.  Interventions:  - Provide teaching at level of understanding  - Provide teaching via preferred learning methods  Outcome: Progressing      SADNESS